# Patient Record
Sex: FEMALE | Race: WHITE | Employment: OTHER | URBAN - METROPOLITAN AREA
[De-identification: names, ages, dates, MRNs, and addresses within clinical notes are randomized per-mention and may not be internally consistent; named-entity substitution may affect disease eponyms.]

---

## 2018-03-29 ENCOUNTER — TRANSFERRED RECORDS (OUTPATIENT)
Dept: HEALTH INFORMATION MANAGEMENT | Facility: CLINIC | Age: 73
End: 2018-03-29

## 2018-05-08 LAB — MAMMOGRAM: NORMAL

## 2018-05-15 ENCOUNTER — TRANSFERRED RECORDS (OUTPATIENT)
Dept: HEALTH INFORMATION MANAGEMENT | Facility: CLINIC | Age: 73
End: 2018-05-15

## 2018-11-15 ENCOUNTER — TRANSFERRED RECORDS (OUTPATIENT)
Dept: HEALTH INFORMATION MANAGEMENT | Facility: CLINIC | Age: 73
End: 2018-11-15

## 2018-12-20 ENCOUNTER — TRANSFERRED RECORDS (OUTPATIENT)
Dept: HEALTH INFORMATION MANAGEMENT | Facility: CLINIC | Age: 73
End: 2018-12-20

## 2019-01-30 ENCOUNTER — DOCUMENTATION ONLY (OUTPATIENT)
Dept: CARE COORDINATION | Facility: CLINIC | Age: 74
End: 2019-01-30

## 2019-02-15 NOTE — TELEPHONE ENCOUNTER
FUTURE VISIT INFORMATION      FUTURE VISIT INFORMATION:    Date: 2/20/19    Time: 2:15PM    [place] [facility] [place]ERR[place] INFORMATION:    Referring provider:  Aundrea Sommers MD      Referring providers clinic:  [facility]     Reason for visit/diagnosis  Chronic sphenoidal sinusitis      RECORDS REQUESTED FROM:       Clinic name Comments Records Status Imaging Status   [facility]   11/7/18, 10/4/17 notes with Dr Sommers Care Everywhere    [facility]  - ENT 3/28/18, 4/18/18  notes with Juan Carlos Burden  Care Everywhere    [facility]  Imaging - ph. [phone]   3/29/18 CT Sinus Report: Care Everywhere PACS   [facility] 5/15/18, 5/24/18, 8/21/18, 10/16/18, 10/19/18, 10/22/18, 10/29/18, 11/15/18, notes with Dr Portillo    11/15/18 CT Sinus In [facility] PACS - rec'vd images 2/19 sent to Film room to upload       2/15/19 7:20AM sent a fax request via [employer] to [facility] - [place]  2/15/19 12:44PM sent a fax request to [facility] to push images to [facility] P[facility] - [place]  2/19/19 10:12AM called [facility], CT sinus images will be pushed to [facility] PACS sometime today - [place]  2/19/19 11:58AM received images from [facility], [place] [facility] [place] sending disc to film room to get uploaded, might not be available to view at the time of appointment - [place]

## 2019-02-20 ENCOUNTER — PRE VISIT (OUTPATIENT)
Dept: OTOLARYNGOLOGY | Facility: CLINIC | Age: 74
End: 2019-02-20

## 2019-06-12 ENCOUNTER — OFFICE VISIT (OUTPATIENT)
Dept: OTOLARYNGOLOGY | Facility: CLINIC | Age: 74
End: 2019-06-12
Payer: MEDICARE

## 2019-06-12 VITALS
SYSTOLIC BLOOD PRESSURE: 133 MMHG | BODY MASS INDEX: 22.38 KG/M2 | DIASTOLIC BLOOD PRESSURE: 79 MMHG | HEIGHT: 65 IN | HEART RATE: 91 BPM | WEIGHT: 134.3 LBS | RESPIRATION RATE: 16 BRPM

## 2019-06-12 DIAGNOSIS — J32.3 SINUSITIS CHRONIC, SPHENOIDAL: Primary | ICD-10-CM

## 2019-06-12 RX ORDER — SIMVASTATIN 10 MG
10 TABLET ORAL AT BEDTIME
COMMUNITY
Start: 2019-05-22

## 2019-06-12 RX ORDER — PREDNISONE 1 MG/1
TABLET ORAL
Refills: 2 | COMMUNITY
Start: 2019-04-03 | End: 2019-07-01

## 2019-06-12 RX ORDER — LANOLIN ALCOHOL/MO/W.PET/CERES
CREAM (GRAM) TOPICAL
COMMUNITY

## 2019-06-12 RX ORDER — LISINOPRIL 10 MG/1
10 TABLET ORAL AT BEDTIME
COMMUNITY
Start: 2018-05-04

## 2019-06-12 RX ORDER — CETIRIZINE HYDROCHLORIDE 10 MG/1
10 TABLET ORAL EVERY MORNING
COMMUNITY
Start: 2017-02-01

## 2019-06-12 ASSESSMENT — PAIN SCALES - GENERAL: PAINLEVEL: NO PAIN (0)

## 2019-06-12 ASSESSMENT — MIFFLIN-ST. JEOR: SCORE: 1120.06

## 2019-06-12 NOTE — LETTER
2019       RE: Sabine Mccormack  [address] [facility] [address]     Dear Colleague,    Thank you for referring your patient, Sabine Mccormack, to the [facility] EAR NOSE AND THROAT at [facility]. Please see a copy of my visit note below.    Otolaryngology Adult Consultation    Patient: Sabine Mccormack   : 1945     Patient presents with:  Consult:   Chief Complaint   Patient presents with     Consult     surgery consult, chronic sphenoidal sinusitis        Referring Provider: Aundrea Sommers   Consulting Physician:  Nayely Lorenzo MD       Assessment/Plan: Chronic sphenoid sinusitis in patient on immunosuppression. Recommend definitive surgical treatment. Discussed risks and benefits. She has some concerns regarding anesthesia - has not had prior surgery. Discussed risks and benefits of surgery.      HPI: Sabine Mccormack is a 74 year old female seen today in the Otolaryngology Clinic in consultation from Aundrea Sommers for a history of chronic sphenoid sinusitis.  Symptoms include headache, post nasal drainage and congeston. Duration of symptoms has been years. The sphenoid sinus issue was discovered on imaging after she saw an ENT physician for these issues as well as epistaxis.  She has seen Dr. Portillo for these issues since that time. After medical therapy her symptoms persisted, therefore, an in office turbinate reduction and sphenoidotomy was recommended and performed. The patient describes significant difficulty since that time and presents after repeat imaging shows ongoing sphenoid disease.     Current Outpatient Medications on File Prior to Visit:  cetirizine (ZYRTEC) 10 MG tablet Take 10 mg by mouth every 24 hours   lisinopril (PRINIVIL/ZESTRIL) 10 MG tablet every 24 hours   simvastatin (ZOCOR) 10 MG tablet every 24 hours   folic acid (FOLVITE) 400 MCG tablet folic acid   Methotrexate Sodium (TREXALL PO) methotrexate sodium   predniSONE (DELTASONE) 1 MG tablet  TK 3 TS PO ONCE D WITH A MEAL OR UTD BY MD.     No current facility-administered medications on file prior to visit.        Allergies   Allergen Reactions     Sulfa Drugs Hives       Past Medical History:   Diagnosis Date     Allergic rhinitis      Arthritis      Autoimmune disease (H)      Chronic sinusitis      Chronic tonsillitis      Hoarseness      Hypertension      Immunosuppression (H)      Osteoporosis      Reduced vision        Social History     Occupational History     Not on file   Tobacco Use     Smoking status: Never Smoker     Smokeless tobacco: Never Used   Substance and Sexual Activity     Alcohol use: Not on file     Drug use: Not on file     Sexual activity: Not on file        Review of Systems   ENT ROS 6/9/2019   Ears, Nose, Throat Ear pain, Nasal congestion or drainage, Trouble swallowing        14 point ROS neg other than the symptoms noted above.    Physical Exam:    General Assessment   The patient is alert, oriented and in no acute distress.   Head/Face/Scalp  Grossly normal.   Ears  Normal canals, auricles and tympanic membranes.   Nose  External nose is straight, skin is normal. Intranasal exam (anterior rhinoscopy) reveals normal moist mucosa, turbinate tissue without edema, erythema or crusting.  Septum mainly in the midline.    Mouth  Oral cavity shows healthy mucosa with out ulceration, masses or other lesions involving the tongue, palate, buccal mucosa, floor of mouth or gingiva.  Dentition in good repair.  Oropharynx with normal tonsils, posterior wall and base of tongue.  Neck  No significant adenopathy, thyroid or salivary gland abnormality.         Again, thank you for allowing me to participate in the care of your patient.      Sincerely,    Nayely Lorenzo MD

## 2019-06-12 NOTE — NURSING NOTE
Chief Complaint   Patient presents with     Consult     surgery consult, chronic sphenoidal sinusitis     Nadege Parker LPN

## 2019-06-12 NOTE — PATIENT INSTRUCTIONS
Surgery Teaching      1.You must have a physical exam (called  history and physical ) within 30 days of surgery. You can do this at the PAC clinic or your family clinic.     2.For same-day surgery, you must arrange for an adult to take you home from the Center. An adult must stay with you for the first 24 hours after surgery. You cannot     3.Ask your doctor what medicines (including aspirin and ibuprofen) are safe before surgery.     4. Stop drinking alcohol at least 24 hours before surgery.     5. Stop or at least cut down on smoking 24 hours before surgery.    6.Take a bath or shower the night before and the morning of surgery (as told by your surgeon). Use an antiseptic soap. If your doctor does not give you special soap, buy Hibiclens or Es-Stat at the drug store or ask the pharmacist to suggest a brand.  Do not put on lotion, powder, perfume, deodorant or make-up after bathing.    7. You can eat a normal meal the night before surgery. Do not eat any solid foods or drink any milk products for 8 hours before surgery.     8. You may drink clear liquids until 2 hours before surgery. Clear liquids include water, Gatorade, apple juice and liquids you can read through.

## 2019-06-16 NOTE — PROGRESS NOTES
Otolaryngology Adult Consultation    Patient: Sabine Mccormack   : 1945     Patient presents with:  Consult:   Chief Complaint   Patient presents with     Consult     surgery consult, chronic sphenoidal sinusitis        Referring Provider: Aundrea Sommers   Consulting Physician:  Nayely Lorenzo MD       Assessment/Plan: Chronic sphenoid sinusitis in patient on immunosuppression. Recommend definitive surgical treatment. Discussed risks and benefits. She has some concerns regarding anesthesia - has not had prior surgery. Discussed risks and benefits of surgery.      HPI: Sabine Mccormack is a 74 year old female seen today in the Otolaryngology Clinic in consultation from Aundrea Sommers for a history of chronic sphenoid sinusitis.  Symptoms include headache, post nasal drainage and congeston. Duration of symptoms has been years. The sphenoid sinus issue was discovered on imaging after she saw an ENT physician for these issues as well as epistaxis.  She has seen Dr. Portillo for these issues since that time. After medical therapy her symptoms persisted, therefore, an in office turbinate reduction and sphenoidotomy was recommended and performed. The patient describes significant difficulty since that time and presents after repeat imaging shows ongoing sphenoid disease.     Current Outpatient Medications on File Prior to Visit:  cetirizine (ZYRTEC) 10 MG tablet Take 10 mg by mouth every 24 hours   lisinopril (PRINIVIL/ZESTRIL) 10 MG tablet every 24 hours   simvastatin (ZOCOR) 10 MG tablet every 24 hours   folic acid (FOLVITE) 400 MCG tablet folic acid   Methotrexate Sodium (TREXALL PO) methotrexate sodium   predniSONE (DELTASONE) 1 MG tablet TK 3 TS PO ONCE D WITH A MEAL OR UTD BY MD.     No current facility-administered medications on file prior to visit.        Allergies   Allergen Reactions     Sulfa Drugs Hives       Past Medical History:   Diagnosis Date     Allergic rhinitis      Arthritis      Autoimmune disease (H)       Chronic sinusitis      Chronic tonsillitis      Hoarseness      Hypertension      Immunosuppression (H)      Osteoporosis      Reduced vision        Social History     Occupational History     Not on file   Tobacco Use     Smoking status: Never Smoker     Smokeless tobacco: Never Used   Substance and Sexual Activity     Alcohol use: Not on file     Drug use: Not on file     Sexual activity: Not on file        Review of Systems   ENT ROS 6/9/2019   Ears, Nose, Throat Ear pain, Nasal congestion or drainage, Trouble swallowing        14 point ROS neg other than the symptoms noted above.    Physical Exam:    General Assessment   The patient is alert, oriented and in no acute distress.   Head/Face/Scalp  Grossly normal.   Ears  Normal canals, auricles and tympanic membranes.   Nose  External nose is straight, skin is normal. Intranasal exam (anterior rhinoscopy) reveals normal moist mucosa, turbinate tissue without edema, erythema or crusting.  Septum mainly in the midline.    Mouth  Oral cavity shows healthy mucosa with out ulceration, masses or other lesions involving the tongue, palate, buccal mucosa, floor of mouth or gingiva.  Dentition in good repair.  Oropharynx with normal tonsils, posterior wall and base of tongue.  Neck  No significant adenopathy, thyroid or salivary gland abnormality.

## 2019-06-20 ENCOUNTER — DOCUMENTATION ONLY (OUTPATIENT)
Dept: OTOLARYNGOLOGY | Facility: CLINIC | Age: 74
End: 2019-06-20

## 2019-06-20 NOTE — PROGRESS NOTES
Patient is scheduled for surgery with Dr. Lorenzo     Proc:  Right Sphenoidotomy with Stealth    Spoke or left message with: patient    Date of Surgery: 7/8/19    Location: [facility]    H&P: Scheduled with PAC  7/1/19  9:30 am    Additional imaging/appointments: Post OP 7/17/19  1:00    Surgery packet: given at clinic appt             Chela Sanchez   [facility] [place]-Op Coordinator  [phone]

## 2019-06-21 ENCOUNTER — PRE VISIT (OUTPATIENT)
Dept: SURGERY | Facility: CLINIC | Age: 74
End: 2019-06-21

## 2019-06-21 NOTE — TELEPHONE ENCOUNTER
FUTURE VISIT INFORMATION      SURGERY INFORMATION:    Date: 7/8/19    Location: [facility]    Surgeon:  Nayely Lorenzo    Anesthesia Type:  General    RECORDS REQUESTED FROM:       Primary Care Provider: Aundrea Díaz    Pertinent Medical History: Hypertension

## 2019-06-25 ENCOUNTER — TRANSFERRED RECORDS (OUTPATIENT)
Dept: HEALTH INFORMATION MANAGEMENT | Facility: CLINIC | Age: 74
End: 2019-06-25

## 2019-07-01 ENCOUNTER — OFFICE VISIT (OUTPATIENT)
Dept: SURGERY | Facility: CLINIC | Age: 74
End: 2019-07-01
Payer: MEDICARE

## 2019-07-01 ENCOUNTER — ANESTHESIA EVENT (OUTPATIENT)
Dept: SURGERY | Facility: AMBULATORY SURGERY CENTER | Age: 74
End: 2019-07-01

## 2019-07-01 VITALS
OXYGEN SATURATION: 97 % | HEIGHT: 65 IN | DIASTOLIC BLOOD PRESSURE: 79 MMHG | BODY MASS INDEX: 22.92 KG/M2 | RESPIRATION RATE: 16 BRPM | WEIGHT: 137.6 LBS | SYSTOLIC BLOOD PRESSURE: 127 MMHG | HEART RATE: 81 BPM | TEMPERATURE: 97.5 F

## 2019-07-01 DIAGNOSIS — Z01.818 PREOP EXAMINATION: Primary | ICD-10-CM

## 2019-07-01 RX ORDER — IBUPROFEN 200 MG
200-400 TABLET ORAL PRN
COMMUNITY

## 2019-07-01 SDOH — HEALTH STABILITY: MENTAL HEALTH: HOW OFTEN DO YOU HAVE A DRINK CONTAINING ALCOHOL?: 2-3 TIMES A WEEK

## 2019-07-01 ASSESSMENT — MIFFLIN-ST. JEOR: SCORE: 1135.03

## 2019-07-01 NOTE — ANESTHESIA PREPROCEDURE EVALUATION
"Anesthesia Pre-Procedure Evaluation    Patient: Sabine Mccormack   MRN:     [id number] Gender:   female   Age:    74 year old :      1945        Preoperative Diagnosis: Sphenoidal Sinusitis   Procedure(s):  Right Sphenoidotomy with Stealth     Past Medical History:   Diagnosis Date     Allergic rhinitis      Arthritis      Autoimmune disease (H)      Chronic sinusitis      Chronic tonsillitis      Hoarseness      Hypertension      Immunosuppression (H)      Osteoporosis      Reduced vision       Past Surgical History:   Procedure Laterality Date     COLONOSCOPY       ORTHOPEDIC SURGERY      bunion surgery          Anesthesia Evaluation     . Pt has had prior anesthetic. Type: MAC           ROS/MED HX    ENT/Pulmonary: Comment: Chronic sinusitis    (+)allergic rhinitis, , . .    Neurologic: Comment: Chronic \"sinus\" headaches    (+)neuropathy - hands & feet from RA,     Cardiovascular:     (+) hypertension-range: stable on lisinopril, ---. : . . . :. . No previous cardiac testing       METS/Exercise Tolerance:  >4 METS   Hematologic:  - neg hematologic  ROS       Musculoskeletal: Comment: Rheumatoid arthritis (mostly hands/feet/wrist involvement)        GI/Hepatic:  - neg GI/hepatic ROS       Renal/Genitourinary:  - ROS Renal section negative       Endo:  - neg endo ROS       Psychiatric:  - neg psychiatric ROS       Infectious Disease:  - neg infectious disease ROS       Malignancy:      - no malignancy   Other:    (+) No chance of pregnancy C-spine cleared: N/A, no H/O Chronic Pain,no other significant disability                        PHYSICAL EXAM:   Mental Status/Neuro: A/A/O; Age Appropriate   Airway: Facies: Feasible  Mallampati: IV  Mouth/Opening: Limited  TM distance: > 6 cm  Neck ROM: Full   Respiratory: Auscultation: CTAB     Resp. Rate: Normal     Resp. Effort: Normal      CV: Rhythm: Regular  Rate: Age appropriate  Heart: Normal Sounds   Comments:      Dental: Normal                  No results " "found for: WBC, HGB, HCT, PLT, CRP, SED, NA, POTASSIUM, CHLORIDE, CO2, BUN, CR, GLC, STEVEN, PHOS, MAG, ALBUMIN, PROTTOTAL, ALT, AST, GGT, ALKPHOS, BILITOTAL, BILIDIRECT, LIPASE, AMYLASE, JAZMIN, PTT, INR, FIBR, TSH, T4, T3, HCG, HCGS, CKTOTAL, CKMB, TROPN    Preop Vitals  BP Readings from Last 3 Encounters:   07/01/19 127/79   06/12/19 133/79    Pulse Readings from Last 3 Encounters:   07/01/19 81   06/12/19 91      Resp Readings from Last 3 Encounters:   07/01/19 16   06/12/19 16    SpO2 Readings from Last 3 Encounters:   07/01/19 97%      Temp Readings from Last 1 Encounters:   07/01/19 97.5  F (36.4  C) (Oral)    Ht Readings from Last 1 Encounters:   07/01/19 1.651 m (5' 5\")      Wt Readings from Last 1 Encounters:   07/01/19 62.4 kg (137 lb 9.6 oz)    Estimated body mass index is 22.9 kg/m  as calculated from the following:    Height as of this encounter: 1.651 m (5' 5\").    Weight as of this encounter: 62.4 kg (137 lb 9.6 oz).     LDA:            Assessment:   ASA SCORE: 3    NPO Status: > 6 hours since completed Solid Foods   Documentation: H&P complete; Preop Testing complete; Consents complete   Proceeding: Proceed without further delay  Tobacco Use:  NO Active use of Tobacco/UNKNOWN Tobacco use status     Plan:   Anes. Type:  General   Pre-Induction: Midazolam IV; Acetaminophen PO   Induction:  IV (Standard)   Airway: Oral ETT   Access/Monitoring: PIV   Maintenance: Balanced   Emergence: Procedure Site   Logistics: Same Day Surgery     Postop Pain/Sedation Strategy:  Standard-Options: Opioids PRN     PONV Management:  Adult Risk Factors: Female, Non-Smoker, Postop Opioids  Prevention: Ondansetron; Dexamethasone     CONSENT: Direct conversation   Plan and risks discussed with: Patient   Blood Products: Consent Deferred (Minimal Blood Loss)       Comments for Plan/Consent:  CMAC for intubation                  PAC Discussion and Assessment    ASA Classification: 2  Case is suitable for: [facility]  Anesthetic " techniques and relevant risks discussed: GA  Invasive monitoring and risk discussed: No  Types:   Possibility and Risk of blood transfusion discussed: No  NPO instructions given:   Additional anesthetic preparation and risks discussed:   Needs early admission to pre-op area:   Other:     PAC Resident/NP Anesthesia Assessment:  Sabine Mccormack is a 74 year old female scheduled to undergo Right Sphenoidotomy with Stealth with Nayely Lorenzo MD on 7/8/19 at  [facility] for treatment of chronic sinusitis.     She has the following specific operative considerations:      - Pt has had prior anesthetic. Type: MAC - no complications per pt  - Anesthesia considerations:  Refer to PAC assessment in anesthesia records  - Risk of PONV score = 2.  If > 2, anti-emetic intervention recommended.    CARDIAC: METS >4      RCRI : No serious cardiac risks.  0.4% risk of major adverse cardiac event.      HTN - well managed on lisinopril    PULMONARY: [place] # of risks 2/8 = low risk     Never smoked, no asthma    GI: no GERD    ENDO/IMMUNE: BMI 22, no DM     Rheumatoid arthritis, stopped prednisone one month ago, taking methotrexate (will stop 1 week prior to surgery & 1 week after per rheumatologist recommendation @ [facility])    HEME: VTE risk: 0.5%       ORTHO: Mildly limited extension neck ROM     Moderate/Severe TMJ, R>L. Moderate limitation with opening mouth - may require glidescope for intubation      Hands/wrists/feet involvement with RA    NEURO:  Pts mother had significant cognitive decline following anesthesia for hip replacement surgery in 1991. She is concerned about this risk w/ herself, as she has only received MAC in the past for a colonoscopy.     Patient was discussed with Dr Burkett. Patient is optimized and is acceptable candidate for the proposed procedure, provided labs today are within acceptable range. No further diagnostic evaluation is needed.      Reviewed and Signed by PAC Mid-Level  Provider/Resident  Mid-Level Provider/Resident: Beatriz Tafoya PA-C  Date: 7/1/19  Time: 1240    Attending Anesthesiologist Anesthesia Assessment:  Pt seen because of concerns of mouth opening limitations due to TMJ disease.  She brought her jaw MRI for review.  On physical examination, she has 3cm mouth opening with the tongue occupying much of the oral volume.  Her voice is clear.  I am not certain what amount of mouth opening restriction is related to anatomic versus muscular sources.  She should have a videolaryngoscope available for anesthesia induction.  We discussed/described the process of an awake fiberoptic intubation, if that course of action is chosen.  She also has concerns regarding neurological compromise following anesthesia.  Her mother had mild dementia, underwent a LUICANO in 1991, but never recovered neurologic function thereafter.  We discussed the risk of postoperative demential/neurological decline as well as interventions we commonly make in assuring adequate depth of anesthesia and cerebral perfusion.  She will address this, again, the day of surgery.  She is otherwise healthy and in need of no additional evaluation prior to surgery.    Reviewed and Signed by PAC Anesthesiologist  Anesthesiologist: Kwadwo  Date: July 1, 2019  Time:   Pass/Fail: Pass  Disposition:     PAC Pharmacist Assessment:        Pharmacist:   Date:   Time:        Beatriz Tafoya PA-C

## 2019-07-01 NOTE — H&P
Pre-Operative H & P     CC:  Preoperative exam to assess for increased cardiopulmonary risk while undergoing surgery and anesthesia.    Date of Encounter: 7/1/2019  Primary Care Physician:  Aundrea Sommers  Reason for Visit: Sinusitis chronic, sphenoidal [J32.3]  - Primary    HPI  Sabine Mccormack is a 75 y/o female who presents for pre-operative H&P in preparation for Right Sphenoidotomy with Stealth with Nayely Lorenzo MD on 7/8/19 at  [facility] for treatment of chronic sinusitis.    Ms. Mccormack has a long history of several years of chronic sphenoid sinusitis with headache, post nasal drainage and congestion. She underwent an in-office turbinate reduction and sphenoidotomy, but has had significant difficulty since that time. Repeat imaging shows ongoing sphenoid disease. 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 She now presents for the above procedure.    PMH is also significant for rheumatoid arthritis (stable on methotrexate, mostly hand/feet/wrist involvement).     History was obtained from patient & chart review.    Past Medical History  Past Medical History:   Diagnosis Date     Allergic rhinitis      Arthritis      Autoimmune disease (H)      Chronic sinusitis      Chronic tonsillitis      Hoarseness      Hypertension      Immunosuppression (H)      Osteoporosis      Reduced vision        Past Surgical History  Past Surgical History:   Procedure Laterality Date     COLONOSCOPY       ORTHOPEDIC SURGERY      bunion surgery       Hx of Blood transfusions/reactions: no     Hx of abnormal bleeding or anti-platelet use: no    Menstrual history: No LMP recorded. Patient is postmenopausal.:      Steroid use in the last year: yes, stopped prednisone 1 month ago. Currently on methotrexate for RA.    Personal or FH with difficulty with Anesthesia:  Mother had significant cognitive decline following hip surgery in 1991    Prior to Admission Medications  Current  Outpatient Medications   Medication Sig Dispense Refill     Acetaminophen (TYLENOL PO) Take by mouth as needed for mild pain or fever       cetirizine (ZYRTEC) 10 MG tablet Take 10 mg by mouth every morning        Cholecalciferol (VITAMIN D PO) Take 1 tablet by mouth every morning       folic acid (FOLVITE) 400 MCG tablet 1 tablet in the morning       ibuprofen (ADVIL/MOTRIN) 200 MG tablet Take 200-400 mg by mouth as needed for mild pain       lisinopril (PRINIVIL/ZESTRIL) 10 MG tablet Take 10 mg by mouth At Bedtime        Methotrexate Sodium (TREXALL PO) 2.5 mg Every Friday in the morning       simvastatin (ZOCOR) 10 MG tablet Take 10 mg by mouth At Bedtime          Allergies  Allergies   Allergen Reactions     Penicillins Nausea     Other reaction(s): GI Upset     Sulfa Drugs Hives       Social History  Social History     Socioeconomic History     Marital status:      Spouse name: Not on file     Number of children: Not on file     Years of education: Not on file     Highest education level: Not on file   Occupational History     Not on file   Social Needs     Financial resource strain: Not on file     Food insecurity:     Worry: Not on file     Inability: Not on file     Transportation needs:     Medical: Not on file     Non-medical: Not on file   Tobacco Use     Smoking status: Never Smoker     Smokeless tobacco: Never Used   Substance and Sexual Activity     Alcohol use: Yes     Alcohol/week: 0.6 oz     Types: 1 Glasses of wine per week     Frequency: 2-3 times a week     Drug use: Never     Sexual activity: Not on file   Lifestyle     Physical activity:     Days per week: Not on file     Minutes per session: Not on file     Stress: Not on file   Relationships     Social connections:     Talks on phone: Not on file     Gets together: Not on file     Attends Muslim service: Not on file     Active member of club or organization: Not on file     Attends meetings of clubs or organizations: Not on file      "Relationship status: Not on file     Intimate partner violence:     Fear of current or ex partner: Not on file     Emotionally abused: Not on file     Physically abused: Not on file     Forced sexual activity: Not on file   Other Topics Concern     Not on file   Social History Narrative     Not on file       Family History  Family History   Problem Relation Age of Onset     Anesthesia Reaction Mother         had early dementia, then signif decreased mental status after anes.     Myocardial Infarction Mother 65        had \"weak heart\" from [place] flu in childhood     Deep Vein Thrombosis (DVT) No family hx of        ROS/MED HX  The complete review of systems is negative other than noted in the HPI or here.  Patient denies recent illness, fever and respiratory infection during past month.  Pt denies steroid use during past year.    ENT/Pulmonary: Comment: Chronic sinusitis    (+)allergic rhinitis, , . .    Neurologic: Comment: Chronic \"sinus\" headaches    (+)neuropathy - hands & feet from RA,     Cardiovascular:     (+) hypertension-range: stable on lisinopril, ---. : . . . :. . No previous cardiac testing       METS/Exercise Tolerance:  >4 METS   Hematologic:  - neg hematologic  ROS       Musculoskeletal: Comment: Rheumatoid arthritis (mostly hands/feet/wrist involvement)        GI/Hepatic:  - neg GI/hepatic ROS       Renal/Genitourinary:  - ROS Renal section negative       Endo:  - neg endo ROS       Psychiatric:  - neg psychiatric ROS       Infectious Disease:  - neg infectious disease ROS       Malignancy:      - no malignancy   Other:    (+) No chance of pregnancy C-spine cleared: N/A, no H/O Chronic Pain,no other significant disability                PHYSICAL EXAM:   Mental Status/Neuro: A/A/O; Age Appropriate   Airway: Facies: Feasible  Mallampati: IV  Mouth/Opening: Limited  TM distance: > 6 cm  Neck ROM: Full   Respiratory: Auscultation: CTAB     Resp. Rate: Normal     Resp. Effort: Normal      CV: Rhythm: " "Regular  Rate: Age appropriate  Heart: Normal Sounds   Comments:      Dental: Normal                Preop Vitals  BP Readings from Last 3 Encounters:   07/01/19 127/79   06/12/19 133/79    Pulse Readings from Last 3 Encounters:   07/01/19 81   06/12/19 91      Resp Readings from Last 3 Encounters:   07/01/19 16   06/12/19 16    SpO2 Readings from Last 3 Encounters:   07/01/19 97%      Temp Readings from Last 1 Encounters:   07/01/19 97.5  F (36.4  C) (Oral)    Ht Readings from Last 1 Encounters:   07/01/19 1.651 m (5' 5\")      Wt Readings from Last 1 Encounters:   07/01/19 62.4 kg (137 lb 9.6 oz)    Estimated body mass index is 22.9 kg/m  as calculated from the following:    Height as of this encounter: 1.651 m (5' 5\").    Weight as of this encounter: 62.4 kg (137 lb 9.6 oz).     Temp: 97.5  F (36.4  C) Temp src: Oral BP: 127/79 Pulse: 81   Resp: 16 SpO2: 97 %         137 lbs 9.6 oz  5' 5\"   Body mass index is 22.9 kg/m .    Physical Exam  Constitutional: Awake, alert, cooperative, no apparent distress, and appears stated age.  Eyes: Pupils equal, round and reactive to light, extra ocular muscles intact, sclera clear, conjunctiva normal.  HENT: Normocephalic, oral pharynx with moist mucus membranes, good dentition. No goiter appreciated. No removable dental hardware. Limited mouth opening ~ 3 cm.  Respiratory: Clear to auscultation bilaterally, no crackles or wheezing.   Cardiovascular: Regular rate and rhythm, normal S1 and S2, and no murmur noted.  Carotids +2, no bruits. No edema. Palpable pulses to radial, DP and PT arteries.   GI: Normal bowel sounds, soft, non-distended, non-tender, no masses palpated.    Lymph/Hematologic: No cervical lymphadenopathy and no supraclavicular lymphadenopathy.  Genitourinary:  deferred  Skin: Warm and dry.  No rashes.   Musculoskeletal: Mildly limited extention ROM of neck. There is no redness, warmth, or swelling of the joints. Gross motor strength is normal.    Neurologic: " Awake, alert, oriented to name, place and time. Cranial nerves II-XII are grossly intact. Gait is normal.   Neuropsychiatric: Calm, cooperative. Normal affect. Pleasant. Answers questions appropriately, follows commands w/o difficulty.    PRIOR LABS/DIAGNOSTIC STUDIES:  All labs and imaging personally reviewed    Labs 6/25/19: WBC 6.3, Hgb 13.3, hematocrit 40.0, Plt 300    Creatinine 0.81, GFR >60, ALT 18, AST 21     ANCA negative MPO & PR3    MRI SKULL BASE & TEMPOROMANDIBULAR JOINTS 12/20/18:  1. Left TMJ: Early intermediate stage II internal derangement.  2. Right TMJ: Intermediate stage III internal derangement with active inflammatory arthropathy & mildly restricted translation.  3. Opacification of the sphenoid sinus centrally and on the right side, indicative of sphenoid ostium obstruction.  4. 14 mm fluid-containing, irregular cystic structure subcutaneously on the right, anterior to the right masseter. Diagnostic considerations would include an old, abscess cavity and possibly a cyst relating to the right parotid duct, which passes in this immediate vicinity.    Labs today: not indicated    Outside records reviewed from: Care Everywhere    ASSESSMENT and PLAN  Sabine Mccormack is a 74 year old female scheduled to undergo Right Sphenoidotomy with Stealth with Nayeyl Lorenzo MD on 7/8/19 at  [facility] for treatment of chronic sinusitis.     She has the following specific operative considerations:      - Pt has had prior anesthetic. Type: MAC - no complications per pt  - Anesthesia considerations:  Refer to PAC assessment in anesthesia records  - Risk of PONV score = 2.  If > 2, anti-emetic intervention recommended.    CARDIAC: METS >4      RCRI : No serious cardiac risks.  0.4% risk of major adverse cardiac event.      HTN - well managed on lisinopril    PULMONARY: [place] # of risks 2/8 = low risk     Never smoked, no asthma    GI: no GERD    ENDO/IMMUNE: BMI 22, no DM     Rheumatoid  arthritis, stopped prednisone one month ago, taking methotrexate (will stop 1 week prior to surgery & 1 week after per rheumatologist recommendation @ [facility])    HEME: VTE risk: 0.5%       ORTHO: Mildly limited extension neck ROM     Moderate/Severe TMJ, R>L. Moderate limitation with opening mouth - may require glidescope for intubation      Hands/wrists/feet involvement with RA    NEURO:  Pts mother had significant cognitive decline following anesthesia for hip replacement surgery in 1991. She is concerned about this risk w/ herself, as she has only received MAC in the past for a colonoscopy.     Patient was discussed with Dr Burkett. Patient is optimized and is acceptable candidate for the proposed procedure, provided labs today are within acceptable range. No further diagnostic evaluation is needed.      Arrival time, NPO, shower and medication instructions provided by nursing staff today.  Preparing For Your Surgery handout given.      Beatriz Tafoya PA-C  Preoperative Assessment Center  [facility] and Surgery Center  Phone: [phone]  Fax: [phone]

## 2019-07-01 NOTE — PATIENT INSTRUCTIONS
Preparing for Your Surgery      Name:  Sabine Mccormack   MRN:  [id number]   :  1945   Today's Date:  2019     Arriving for surgery:  Surgery date:  19  Arrival time:  08:00 am  Please come to:     [facility]  [address] [place] [id number]     Parking is available in front of the Clinics and Surgery Center building from 5:30AM to 8:00PM.  -  Proceed to the 5th floor to check into the Ambulatory Surgery Center.              >> There will be patient concierges on the 1st and 5th floor, for assistance or an escort, if you would like.              >> Please call [phone] with any questions.    What can I eat or drink?  -  You may have solid food or milk products until 8 hours prior to your surgery.  -  You may have water, apple juice or 7up/Sprite until 2 hours prior to your surgery.    Which medicines can I take?  Stop Aspirin, vitamins and supplements one week prior to surgery.  Hold Ibuprofen for 24 hours and/or Naproxen for 48 hours prior to surgery.   -  Do NOT take these medications in the morning, the day of surgery:  Lisinopril if normally taken in the morning.  -  Please take these medications the day of surgery:  Tylenol if needed; take all other scheduled medications if normally taken in the morning.    How do I prepare myself?  -  Take two showers: one the night before surgery; and one the morning of surgery.         Use Scrubcare or Hibiclens to wash from neck down.  You may use your own     shampoo and conditioner. No other hair products.   -  Do NOT use lotion, powder, deodorant, or antiperspirant the day of your surgery.  -  Do NOT wear any makeup, fingernail polish or jewelry.  - Do not bring your own medications to the hospital, except for inhalers and eye   drops.  -  Bring your ID and insurance card.    -If you are scheduled to go home the Same Day as surgery you must have a responsible adult as a  and to stay with you overnight  the first 24 hours after surgery.     Questions or Concerns:    -If you are scheduled at the Ambulatory Surgery Center please call [phone].    -For questions after surgery please call your surgeons office.

## 2019-07-08 ENCOUNTER — DOCUMENTATION ONLY (OUTPATIENT)
Dept: CARE COORDINATION | Facility: CLINIC | Age: 74
End: 2019-07-08

## 2019-07-08 ENCOUNTER — ANESTHESIA (OUTPATIENT)
Dept: SURGERY | Facility: AMBULATORY SURGERY CENTER | Age: 74
End: 2019-07-08

## 2019-07-08 ENCOUNTER — HOSPITAL ENCOUNTER (OUTPATIENT)
Facility: AMBULATORY SURGERY CENTER | Age: 74
End: 2019-07-08
Attending: OTOLARYNGOLOGY
Payer: MEDICARE

## 2019-07-08 VITALS
WEIGHT: 137.9 LBS | OXYGEN SATURATION: 96 % | BODY MASS INDEX: 22.98 KG/M2 | HEIGHT: 65 IN | SYSTOLIC BLOOD PRESSURE: 135 MMHG | RESPIRATION RATE: 16 BRPM | HEART RATE: 84 BPM | TEMPERATURE: 97.8 F | DIASTOLIC BLOOD PRESSURE: 81 MMHG

## 2019-07-08 DIAGNOSIS — J32.3 CHRONIC SPHENOIDAL SINUSITIS: Primary | ICD-10-CM

## 2019-07-08 LAB
GRAM STN SPEC: NORMAL
GRAM STN SPEC: NORMAL
SPECIMEN SOURCE: NORMAL

## 2019-07-08 PROCEDURE — 87176 TISSUE HOMOGENIZATION CULTR: CPT | Performed by: OTOLARYNGOLOGY

## 2019-07-08 PROCEDURE — 87205 SMEAR GRAM STAIN: CPT | Performed by: OTOLARYNGOLOGY

## 2019-07-08 PROCEDURE — 87070 CULTURE OTHR SPECIMN AEROBIC: CPT | Performed by: OTOLARYNGOLOGY

## 2019-07-08 PROCEDURE — 88304 TISSUE EXAM BY PATHOLOGIST: CPT | Performed by: OTOLARYNGOLOGY

## 2019-07-08 PROCEDURE — 87102 FUNGUS ISOLATION CULTURE: CPT | Performed by: OTOLARYNGOLOGY

## 2019-07-08 RX ORDER — MEPERIDINE HYDROCHLORIDE 25 MG/ML
12.5 INJECTION INTRAMUSCULAR; INTRAVENOUS; SUBCUTANEOUS
Status: DISCONTINUED | OUTPATIENT
Start: 2019-07-08 | End: 2019-07-09 | Stop reason: HOSPADM

## 2019-07-08 RX ORDER — OXYCODONE HYDROCHLORIDE 5 MG/1
5-10 TABLET ORAL EVERY 4 HOURS PRN
Qty: 30 TABLET | Refills: 0 | Status: SHIPPED | OUTPATIENT
Start: 2019-07-08 | End: 2019-07-08

## 2019-07-08 RX ORDER — ONDANSETRON 2 MG/ML
INJECTION INTRAMUSCULAR; INTRAVENOUS PRN
Status: DISCONTINUED | OUTPATIENT
Start: 2019-07-08 | End: 2019-07-08

## 2019-07-08 RX ORDER — ONDANSETRON 2 MG/ML
4 INJECTION INTRAMUSCULAR; INTRAVENOUS EVERY 30 MIN PRN
Status: DISCONTINUED | OUTPATIENT
Start: 2019-07-08 | End: 2019-07-09 | Stop reason: HOSPADM

## 2019-07-08 RX ORDER — FENTANYL CITRATE 50 UG/ML
25-50 INJECTION, SOLUTION INTRAMUSCULAR; INTRAVENOUS
Status: DISCONTINUED | OUTPATIENT
Start: 2019-07-08 | End: 2019-07-08 | Stop reason: HOSPADM

## 2019-07-08 RX ORDER — OXYMETAZOLINE HYDROCHLORIDE 0.05 G/100ML
SPRAY NASAL
Qty: 1 BOTTLE | Refills: 0 | Status: SHIPPED | OUTPATIENT
Start: 2019-07-08

## 2019-07-08 RX ORDER — OXYCODONE HYDROCHLORIDE 5 MG/1
5 TABLET ORAL EVERY 4 HOURS PRN
Status: DISCONTINUED | OUTPATIENT
Start: 2019-07-08 | End: 2019-07-09 | Stop reason: HOSPADM

## 2019-07-08 RX ORDER — LIDOCAINE 40 MG/G
CREAM TOPICAL
Status: DISCONTINUED | OUTPATIENT
Start: 2019-07-08 | End: 2019-07-08 | Stop reason: HOSPADM

## 2019-07-08 RX ORDER — ACETAMINOPHEN 325 MG/1
975 TABLET ORAL ONCE
Status: COMPLETED | OUTPATIENT
Start: 2019-07-08 | End: 2019-07-08

## 2019-07-08 RX ORDER — ONDANSETRON 4 MG/1
4 TABLET, ORALLY DISINTEGRATING ORAL EVERY 30 MIN PRN
Status: DISCONTINUED | OUTPATIENT
Start: 2019-07-08 | End: 2019-07-09 | Stop reason: HOSPADM

## 2019-07-08 RX ORDER — OXYMETAZOLINE HYDROCHLORIDE 0.05 G/100ML
SPRAY NASAL PRN
Status: DISCONTINUED | OUTPATIENT
Start: 2019-07-08 | End: 2019-07-08 | Stop reason: HOSPADM

## 2019-07-08 RX ORDER — LIDOCAINE HYDROCHLORIDE 20 MG/ML
INJECTION, SOLUTION INFILTRATION; PERINEURAL PRN
Status: DISCONTINUED | OUTPATIENT
Start: 2019-07-08 | End: 2019-07-08

## 2019-07-08 RX ORDER — NALOXONE HYDROCHLORIDE 0.4 MG/ML
.1-.4 INJECTION, SOLUTION INTRAMUSCULAR; INTRAVENOUS; SUBCUTANEOUS
Status: DISCONTINUED | OUTPATIENT
Start: 2019-07-08 | End: 2019-07-09 | Stop reason: HOSPADM

## 2019-07-08 RX ORDER — SODIUM CHLORIDE, SODIUM LACTATE, POTASSIUM CHLORIDE, CALCIUM CHLORIDE 600; 310; 30; 20 MG/100ML; MG/100ML; MG/100ML; MG/100ML
INJECTION, SOLUTION INTRAVENOUS CONTINUOUS
Status: DISCONTINUED | OUTPATIENT
Start: 2019-07-08 | End: 2019-07-09 | Stop reason: HOSPADM

## 2019-07-08 RX ORDER — HYDROCODONE BITARTRATE AND ACETAMINOPHEN 5; 325 MG/1; MG/1
1 TABLET ORAL EVERY 6 HOURS PRN
Qty: 12 TABLET | Refills: 0 | Status: SHIPPED | OUTPATIENT
Start: 2019-07-08 | End: 2019-11-04

## 2019-07-08 RX ORDER — PROPOFOL 10 MG/ML
INJECTION, EMULSION INTRAVENOUS PRN
Status: DISCONTINUED | OUTPATIENT
Start: 2019-07-08 | End: 2019-07-08

## 2019-07-08 RX ORDER — HYDROMORPHONE HYDROCHLORIDE 1 MG/ML
.3-.5 INJECTION, SOLUTION INTRAMUSCULAR; INTRAVENOUS; SUBCUTANEOUS EVERY 10 MIN PRN
Status: DISCONTINUED | OUTPATIENT
Start: 2019-07-08 | End: 2019-07-09 | Stop reason: HOSPADM

## 2019-07-08 RX ORDER — DEXAMETHASONE SODIUM PHOSPHATE 4 MG/ML
INJECTION, SOLUTION INTRA-ARTICULAR; INTRALESIONAL; INTRAMUSCULAR; INTRAVENOUS; SOFT TISSUE PRN
Status: DISCONTINUED | OUTPATIENT
Start: 2019-07-08 | End: 2019-07-08

## 2019-07-08 RX ORDER — SODIUM CHLORIDE, SODIUM LACTATE, POTASSIUM CHLORIDE, CALCIUM CHLORIDE 600; 310; 30; 20 MG/100ML; MG/100ML; MG/100ML; MG/100ML
INJECTION, SOLUTION INTRAVENOUS CONTINUOUS
Status: DISCONTINUED | OUTPATIENT
Start: 2019-07-08 | End: 2019-07-08 | Stop reason: HOSPADM

## 2019-07-08 RX ORDER — FENTANYL CITRATE 50 UG/ML
INJECTION, SOLUTION INTRAMUSCULAR; INTRAVENOUS PRN
Status: DISCONTINUED | OUTPATIENT
Start: 2019-07-08 | End: 2019-07-08

## 2019-07-08 RX ORDER — LIDOCAINE HYDROCHLORIDE AND EPINEPHRINE 10; 10 MG/ML; UG/ML
INJECTION, SOLUTION INFILTRATION; PERINEURAL PRN
Status: DISCONTINUED | OUTPATIENT
Start: 2019-07-08 | End: 2019-07-08 | Stop reason: HOSPADM

## 2019-07-08 RX ORDER — ACETAMINOPHEN 325 MG/1
650 TABLET ORAL EVERY 4 HOURS PRN
Qty: 100 TABLET | Refills: 0 | Status: SHIPPED | OUTPATIENT
Start: 2019-07-08 | End: 2019-07-08

## 2019-07-08 RX ADMIN — OXYCODONE HYDROCHLORIDE 5 MG: 5 TABLET ORAL at 11:24

## 2019-07-08 RX ADMIN — SODIUM CHLORIDE, SODIUM LACTATE, POTASSIUM CHLORIDE, CALCIUM CHLORIDE: 600; 310; 30; 20 INJECTION, SOLUTION INTRAVENOUS at 08:30

## 2019-07-08 RX ADMIN — LIDOCAINE HYDROCHLORIDE 80 MG: 20 INJECTION, SOLUTION INFILTRATION; PERINEURAL at 09:42

## 2019-07-08 RX ADMIN — Medication 20 MG: at 09:54

## 2019-07-08 RX ADMIN — ONDANSETRON 4 MG: 2 INJECTION INTRAMUSCULAR; INTRAVENOUS at 10:39

## 2019-07-08 RX ADMIN — PROPOFOL 50 MG: 10 INJECTION, EMULSION INTRAVENOUS at 10:38

## 2019-07-08 RX ADMIN — Medication 10 MG: at 10:20

## 2019-07-08 RX ADMIN — DEXAMETHASONE SODIUM PHOSPHATE 4 MG: 4 INJECTION, SOLUTION INTRA-ARTICULAR; INTRALESIONAL; INTRAMUSCULAR; INTRAVENOUS; SOFT TISSUE at 09:42

## 2019-07-08 RX ADMIN — ACETAMINOPHEN 975 MG: 325 TABLET ORAL at 08:30

## 2019-07-08 RX ADMIN — FENTANYL CITRATE 50 MCG: 50 INJECTION, SOLUTION INTRAMUSCULAR; INTRAVENOUS at 11:26

## 2019-07-08 RX ADMIN — PROPOFOL 40 MG: 10 INJECTION, EMULSION INTRAVENOUS at 10:30

## 2019-07-08 RX ADMIN — FENTANYL CITRATE 50 MCG: 50 INJECTION, SOLUTION INTRAMUSCULAR; INTRAVENOUS at 09:36

## 2019-07-08 RX ADMIN — Medication 100 MCG: at 09:58

## 2019-07-08 RX ADMIN — PROPOFOL 160 MG: 10 INJECTION, EMULSION INTRAVENOUS at 09:42

## 2019-07-08 ASSESSMENT — MIFFLIN-ST. JEOR: SCORE: 1136.39

## 2019-07-08 NOTE — BRIEF OP NOTE
[facility]    Brief Operative Note    Pre-operative diagnosis: Sphenoidal Sinusitis  Post-operative diagnosis * No post-op diagnosis entered *  Procedure: Procedure(s):  Right Sphenoidotomy with Stealth Image Guidance  Surgeon: Surgeon(s) and Role:     * Nayely Lorenzo MD - Primary     * Mann Salinas MD - Resident - Assisting  Anesthesia: General   Estimated blood loss: Less than 50 ml  Drains: None  Specimens:   ID Type Source Tests Collected by Time Destination   1 : Right sphenoid sinus Tissue Sinus Contents, Right FUNGUS CULTURE, GRAM STAIN, TISSUE CULTURE AEROBIC BACTERIAL Nayely Lorenzo MD 7/8/2019 10:05 AM    A : Right sphenoid-rule out vasculitis and granulomas Tissue Sinus Contents, Right SURGICAL PATHOLOGY EXAM Nayely Lorenzo MD 7/8/2019 10:38 AM      Findings:   Right sphenoid fungal ball removed with irrigation and suction. Small branch of SPA at inferior aspect of sphenoid oss controlled with suction cautery. .  Complications: None.  Implants:  * No implants in log *

## 2019-07-08 NOTE — ANESTHESIA POSTPROCEDURE EVALUATION
Anesthesia POST Procedure Evaluation    Patient: Sabine Mccormack   MRN:     [id number] Gender:   female   Age:    74 year old :      1945        Preoperative Diagnosis: Sphenoidal Sinusitis   Procedure(s):  Right Sphenoidotomy with Stealth Image Guidance   Postop Comments: No value filed.       Anesthesia Type:  General  No value filed.    Reportable Event: NO     PAIN: Uncomplicated   Sign Out status: Comfortable, Well controlled pain     PONV: No PONV   Sign Out status:  No Nausea or Vomiting     Neuro/Psych: Uneventful perioperative course   Sign Out Status: Preoperative baseline; Age appropriate mentation     Airway/Resp.: Uneventful perioperative course   Sign Out Status: Non labored breathing, age appropriate RR; Resp. Status within EXPECTED Parameters     CV: Uneventful perioperative course   Sign Out status: Appropriate BP and perfusion indices; Appropriate HR/Rhythm     Disposition:   Sign Out in:  PACU  Disposition:  Phase II; Home  Recovery Course: Uneventful  Follow-Up: Not required           Last Anesthesia Record Vitals:  CRNA VITALS  2019 1034 - 2019 1134      2019             Pulse:  86    SpO2:  100 %    Resp Rate (observed):  6  (Abnormal)     Resp Rate (set):  10    EKG:  Sinus rhythm          Last PACU Vitals:  Vitals Value Taken Time   /85 2019 11:45 AM   Temp 36.4  C (97.6  F) 2019 11:45 AM   Pulse 66 2019 11:44 AM   Resp 12 2019 11:45 AM   SpO2 97 % 2019 11:45 AM   Temp src     NIBP     Pulse     SpO2     Resp     Temp     Ht Rate     Temp 2     Vitals shown include unvalidated device data.      Electronically Signed By: Ezra Mcclendon MD, 2019, 2:29 PM

## 2019-07-08 NOTE — OP NOTE
Procedure Date: 07/08/2019      STAFF SURGEON:  Nayely Lorenzo MD        RESIDENT SURGEON:  Mann Salinas MD       PROCEDURE PERFORMED:  Right endoscopic sphenoidotomy with image guidance.      PREOPERATIVE DIAGNOSIS:  Right chronic sphenoid sinusitis.      POSTOPERATIVE DIAGNOSIS:  Right chronic sphenoid sinusitis.      INDICATIONS FOR THE PROCEDURE:  Ms. Mccormack is a 74-year-old female with a long-standing history of headaches, postnasal drip and nasal congestion who was found to have chronic right sphenoid sinusitis.  This was discovered by an outside ENT physician when she was being evaluated for recurrent epistaxis.  She had an in-office turbinate reduction and sphenoidotomy, but continues to have symptoms.  The above procedures were therefore indicated.      FINDINGS:  Right sphenoid fungal ball.  A small branch of sphenopalatine artery inferior to the surgical sphenoid os cauterized with suction cautery.      ANESTHESIA:  General.      ESTIMATED BLOOD LOSS:  Less than 50 mL.      IMPLANTS:  Absorbable Gelfoam.      SPECIMENS:    1) Right sphenoid sinus contents for culture  2) Right sphenoid sinus contents for surgical pathology to rule out vasculitis and granulomas.      COMPLICATIONS:  None.      DESCRIPTION OF THE PROCEDURE:  After obtaining informed consent, the patient was brought back to the operating room and positioned supine on the operating table.  General anesthesia was induced, and the patient was orotracheally intubated.  The bed was turned 90 degrees, and the patient was prepped and draped in the standard clean fashion.  A time out was performed, and all parties were in agreement.  The Stealth image guidance system was set up and deemed to be working appropriately. Stealth was utilized to confirm landmarks for the sphenoid opening.  The entire procedure was performed under endoscopic visualization.  At this point, the right septum, inferior turbinate and middle turbinate were injected with  approximately 3.5 mL of 1% lidocaine with 1:100,000 epinephrine.  The middle turbinate was lateralized, and the scope was advanced to the level of the superior turbinate.  The superior turbinate was cauterized with suction cautery, and part of the turbinate was removed with a straight through-cut.  At this point, the sphenoid os was readily visible as well as some thick, inspissated material within.  The sphenoid os was widened using a combination of rongeurs, Blakesley forceps, straight and curved suction and the shaver.  When the sphenoid os was widened sufficiently, the sphenoid cavity was irrigated with saline, and what appeared to be a fungal ball was debrided.  This was sent for culture and pathology.  A lateral compartment of the sphenoid required additional maneuvers to enter, which included the use of a curved suction, backbiting forceps and a curved curet. A small arterial bleeder was noted just inferior to the surgical sphenoid os.  This was controlled with suction cautery.  The lateral compartment of the sphenoid that had been previously opened was irrigated, and the contents were removed with the suction.  A piece of Gelfoam was placed within the sphenoid os and between the middle turbinate and the septum.  The middle turbinate was medialized, and hemostasis was achieved.  This concluded the procedure.  The patient's care was turned back over to Anesthesia.  Her anesthesia was lightened, and she was extubated without complication.  She was transferred to the PACU in stable condition.  Dr. Christian Lorenzo was present for all critical portions of the procedure.      Dictated by Mann Salinas MD   Resident      I was present with the resident during the entire procedure and participated in the critical aspects.    CHRISTIAN LORENZO MD       As dictated by MANN SALINAS MD            D: 07/08/2019   T: 07/08/2019   MT: [email or web address]      Name:     JOANNE GARCIA   MRN:      [id number]        Account:         [id number]   :      1945           Procedure Date: 2019      Document: [id number]

## 2019-07-08 NOTE — DISCHARGE INSTRUCTIONS
[facility]  Home Care Following Anesthesia  For 24 hours after surgery:  1. Get plenty of rest.  A responsible adult must stay with you for at least 24 hours after you leave the surgery center.  2. Do not drive or use heavy equipment.  If you have weakness or tingling, don't drive or use heavy equipment until this feeling goes away.   3. Do not drink alcohol.   4. Avoid strenuous or risky activities.  Ask for help when climbing stairs.  5. You may feel lightheaded.  IF so, sit for a few minutes before standing.  Have someone help you get up.   6. If you have nausea (feel sick to your stomach): Drink only clear liquids such as apple juice, ginger ale, broth or 7-Up.  Rest may also help.  Be sure to drink enough fluids.  Move to a regular diet as you feel able.   7. You may have a slight fever.  Call the doctor if your fever is over 100 F (37.7 C) (taken under the tongue) or lasts longer than 24 hours.  8. You may have a dry mouth, a sore throat, muscle aches or trouble sleeping. These should go away after 24 hours.  9. Do not make important or legal decisions.   If you use hormonal birth control (such as the pill, patch, ring or implants):  You will need a second form of birth control for 7 days (condoms, a diaphragm or contraceptive foam).  While in the surgery center, you received a medicine called Sugammadex.  Hormonal birth control (such as the pill, patch, ring or implants) will not work as well for a week after taking this medicine.         Tips for taking pain medications  To get the best pain relief possible, remember these points:    Take pain medications as directed, before pain becomes severe.    Pain medication can upset your stomach: taking it with food may help.    Constipation is a common side effect of pain medication. Drink plenty of  fluids.    Eat foods high in fiber. Take a stool softener if recommended by your doctor or pharmacist.    Do not drink alcohol,  drive or operate machinery while taking pain medications.    Ask about other ways to control pain, such as with heat, ice or relaxation.    Tylenol/Acetaminophen Consumption: 975 mg tylenol given at 08:30AM, OK to take additional tylenol after 12:30PM. Follow package instructions.  To help encourage the safe use of acetaminophen, the makers of TYLENOL  have lowered the maximum daily dose for single-ingredient Extra Strength TYLENOL  (acetaminophen) products sold in the [place] from 8 pills per day (4,000 mg) to 6 pills per day (3,000 mg). The dosing interval has also changed from 2 pills every 4-6 hours to 2 pills every 6 hours.    If you feel your pain relief is insufficient, you may take Tylenol/Acetaminophen in addition to your narcotic pain medication.     Be careful not to exceed 3,000 mg of Tylenol/Acetaminophen in a 24 hour period from all sources.    If you are taking extra strength Tylenol/acetaminophen (500 mg), the maximum dose is 6 tablets in 24 hours.    If you are taking regular strength acetaminophen (325 mg), the maximum dose is 9 tablets in 24 hours.    Call a doctor for any of the followin. Signs of infection (fever, growing tenderness at the surgery site, a large amount of drainage or bleeding, severe pain, foul-smelling drainage, redness, swelling).  2. It has been over 8 to 10 hours since surgery and you are still not able to urinate (pass water).  3. Headache for over 24 hours.  Your doctor is:  Dr. Nayely Lorenzo, ENT Otolaryngology: [phone]  Or dial [phone] and ask for the resident on call for:  ENT Otolaryngology  For emergency care, call the:  [place] [facility]:  [phone] (TTY for hearing impaired: [phone])

## 2019-07-08 NOTE — ANESTHESIA CARE TRANSFER NOTE
Patient: Sabine Mccormack    Procedure(s):  Right Sphenoidotomy with Stealth Image Guidance    Diagnosis: Sphenoidal Sinusitis  Diagnosis Additional Information: No value filed.    Anesthesia Type:   No value filed.     Note:  Airway :Face Mask  Patient transferred to:PACU  Comments: VSS/WNL. Responds slowly to commands.Handoff Report: Identifed the Patient, Identified the Reponsible Provider, Reviewed the pertinent medical history, Discussed the surgical course, Reviewed Intra-OP anesthesia mangement and issues during anesthesia, Set expectations for post-procedure period and Allowed opportunity for questions and acknowledgement of understanding      Vitals: (Last set prior to Anesthesia Care Transfer)    CRNA VITALS  7/8/2019 1034 - 7/8/2019 1109      7/8/2019             Pulse:  86    SpO2:  100 %    Resp Rate (observed):  6  (Abnormal)     Resp Rate (set):  10                Electronically Signed By: MUKESH Iyer CRNA  July 8, 2019  11:09 AM

## 2019-07-09 ENCOUNTER — TELEPHONE (OUTPATIENT)
Dept: OTOLARYNGOLOGY | Facility: CLINIC | Age: 74
End: 2019-07-09

## 2019-07-09 NOTE — TELEPHONE ENCOUNTER
[facility]    Phone Message    May a detailed message be left on voicemail: yes    Reason for Call: Other: Patient stopped pain meds and wants to know when she can start taking Ibuprofen?  She is also not sleeping and thinks its due to the decongestant.  Can she stop taking the nose spray?      Action Taken: Message routed to:  [facility] ([facility]): ENT

## 2019-07-10 NOTE — TELEPHONE ENCOUNTER
Spoke to patient and advised that per  it was ok to take ibuprofen for pain and stop nasal spray. Pt verbalized understanding. During conversation pt stated her cheeks were a little red but she was not running a fever at all, thought might be a reaction to something. Advised pt to continue to monitor and give us a call if symptoms worsened.

## 2019-07-11 LAB — COPATH REPORT: NORMAL

## 2019-07-13 LAB
BACTERIA SPEC CULT: NO GROWTH
SPECIMEN SOURCE: NORMAL

## 2019-07-17 ENCOUNTER — OFFICE VISIT (OUTPATIENT)
Dept: OTOLARYNGOLOGY | Facility: CLINIC | Age: 74
End: 2019-07-17
Payer: MEDICARE

## 2019-07-17 VITALS
SYSTOLIC BLOOD PRESSURE: 119 MMHG | WEIGHT: 133.9 LBS | BODY MASS INDEX: 22.31 KG/M2 | RESPIRATION RATE: 16 BRPM | HEIGHT: 65 IN | HEART RATE: 97 BPM | DIASTOLIC BLOOD PRESSURE: 76 MMHG

## 2019-07-17 DIAGNOSIS — J32.3 SINUSITIS CHRONIC, SPHENOIDAL: Primary | ICD-10-CM

## 2019-07-17 ASSESSMENT — PAIN SCALES - GENERAL: PAINLEVEL: NO PAIN (0)

## 2019-07-17 ASSESSMENT — MIFFLIN-ST. JEOR: SCORE: 1118.25

## 2019-07-17 NOTE — LETTER
"2019       RE: Sabine Mccormack  [address] [address]     Dear Colleague,    Thank you for referring your patient, Sabine Mccormack, to the [facility] EAR NOSE AND THROAT at [facility]. Please see a copy of my visit note below.      Otolaryngology Clinic      Name: Sabine Mccormack  MRN: [id number]  Age: 74 year old  : 2019      Chief Complaint:   Postoperative follow up      History of Present Illness:   Sabine Mccormack is a 74 year old female who presents for postoperative follow up.  The patient is 11 days status post right sphenoidotomy.  She feels things went very well and she was able to return to regular activities by 4 or 5 days after surgery.  Essentially she feels back to normal.  Tissue was sent to pathology.  This was consistent with a fungus ball.      2019 Optical tracking system endoscopic sinus surgery (Right) Procedure: Right Sphenoidotomy with Stealth Image Guidance; Surgeon: Nayely Lorenzo MD; Location:  OR          Review of Systems:   Pertinent items are noted in HPI or as in patient entered ROS below, remainder of complete ROS is negative.       Physical Exam:   /76   Pulse 97   Resp 16   Ht 1.651 m (5' 5\")   Wt 60.7 kg (133 lb 14.4 oz)   BMI 22.28 kg/m        General Assessment   The patient is alert, oriented and in no acute distress.   Head/Face/Scalp  Grossly normal.   Nose  External nose is straight, skin is normal.     Procedure:  Endoscopy indicated for postoperative assessment  Topical anesthetic/decongestant spray applied.  Rigid scope used for visualization.  Findings:  Left side was clear.  Right side with dissolving gelfoam between the middle turbinate and septum and plug of crusting at the sphenoid osteum.  The sphenous mucosa was inflamed but healing appropriately.     Assessment and Plan:  Status post right sphenoidotomy, pathology consistent with fungal ball. Healing well without " complication.  Sinus irrigation 2 -3 times per day for the next 7 - 10 days.        Follow-up: in about 2 - 3 months.         Scribe Disclosure:  I, Fiona Sharma, am serving as a scribe to document services personally performed by Nayely Lorenzo MD at this visit, based upon the provider's statements to me. All documentation has been reviewed by the aforementioned provider prior to being entered into the official medical record.     The documentation recorded by the scribe accurately reflects the services I personally performed and the decisions made by me.   Nayely Lorenzo MD

## 2019-07-17 NOTE — NURSING NOTE
Chief Complaint   Patient presents with     RECHECK     post op follow up visit     Nadege Parker LPN

## 2019-07-17 NOTE — PROGRESS NOTES
"  Otolaryngology Clinic      Name: Sabine Mccormack  MRN: [id number]  Age: 74 year old  : 2019      Chief Complaint:   Postoperative follow up      History of Present Illness:   Sabine Mccormack is a 74 year old female who presents for postoperative follow up.  The patient is 11 days status post right sphenoidotomy.  She feels things went very well and she was able to return to regular activities by 4 or 5 days after surgery.  Essentially she feels back to normal.  Tissue was sent to pathology.  This was consistent with a fungus ball.      2019 Optical tracking system endoscopic sinus surgery (Right) Procedure: Right Sphenoidotomy with Stealth Image Guidance; Surgeon: Nayely Lorenzo MD; Location:  OR          Review of Systems:   Pertinent items are noted in HPI or as in patient entered ROS below, remainder of complete ROS is negative.       Physical Exam:   /76   Pulse 97   Resp 16   Ht 1.651 m (5' 5\")   Wt 60.7 kg (133 lb 14.4 oz)   BMI 22.28 kg/m       General Assessment   The patient is alert, oriented and in no acute distress.   Head/Face/Scalp  Grossly normal.   Nose  External nose is straight, skin is normal.     Procedure:  Endoscopy indicated for postoperative assessment  Topical anesthetic/decongestant spray applied.  Rigid scope used for visualization.  Findings:  Left side was clear.  Right side with dissolving gelfoam between the middle turbinate and septum and plug of crusting at the sphenoid osteum.  The sphenous mucosa was inflamed but healing appropriately.     Assessment and Plan:  Status post right sphenoidotomy, pathology consistent with fungal ball. Healing well without complication.  Sinus irrigation 2 -3 times per day for the next 7 - 10 days.        Follow-up: in about 2 - 3 months.         Scribe Disclosure:  I, Fiona Sharma, am serving as a scribe to document services personally performed by Nayely Lorenzo MD at this visit, based upon the provider's " statements to me. All documentation has been reviewed by the aforementioned provider prior to being entered into the official medical record.     The documentation recorded by the scribe accurately reflects the services I personally performed and the decisions made by me.   Nayely Lorenzo MD

## 2019-08-05 LAB
FUNGUS SPEC CULT: NORMAL
SPECIMEN SOURCE: NORMAL

## 2019-10-02 ENCOUNTER — HEALTH MAINTENANCE LETTER (OUTPATIENT)
Age: 74
End: 2019-10-02

## 2019-11-04 ENCOUNTER — OFFICE VISIT (OUTPATIENT)
Dept: OTOLARYNGOLOGY | Facility: CLINIC | Age: 74
End: 2019-11-04
Payer: MEDICARE

## 2019-11-04 VITALS
DIASTOLIC BLOOD PRESSURE: 77 MMHG | BODY MASS INDEX: 22.49 KG/M2 | TEMPERATURE: 98.7 F | WEIGHT: 135 LBS | OXYGEN SATURATION: 97 % | HEART RATE: 88 BPM | SYSTOLIC BLOOD PRESSURE: 125 MMHG | RESPIRATION RATE: 18 BRPM | HEIGHT: 65 IN

## 2019-11-04 DIAGNOSIS — J32.3 SINUSITIS CHRONIC, SPHENOIDAL: Primary | ICD-10-CM

## 2019-11-04 LAB
GRAM STN SPEC: ABNORMAL
Lab: ABNORMAL
SPECIMEN SOURCE: ABNORMAL

## 2019-11-04 PROCEDURE — 87181 SC STD AGAR DILUTION PER AGT: CPT | Performed by: OTOLARYNGOLOGY

## 2019-11-04 PROCEDURE — 87070 CULTURE OTHR SPECIMN AEROBIC: CPT | Performed by: OTOLARYNGOLOGY

## 2019-11-04 PROCEDURE — 87205 SMEAR GRAM STAIN: CPT | Performed by: OTOLARYNGOLOGY

## 2019-11-04 PROCEDURE — 87077 CULTURE AEROBIC IDENTIFY: CPT | Performed by: OTOLARYNGOLOGY

## 2019-11-04 RX ORDER — DOXYCYCLINE HYCLATE 100 MG
100 TABLET ORAL 2 TIMES DAILY
Qty: 42 TABLET | Refills: 0 | Status: SHIPPED | OUTPATIENT
Start: 2019-11-04 | End: 2019-11-25

## 2019-11-04 ASSESSMENT — PAIN SCALES - GENERAL: PAINLEVEL: NO PAIN (0)

## 2019-11-04 ASSESSMENT — MIFFLIN-ST. JEOR: SCORE: 1116.36

## 2019-11-04 NOTE — LETTER
"2019       RE: Sabine Mccormack  [address] [address]     Dear Colleague,    Thank you for referring your patient, Sabine Mccormack, to the [facility] EAR NOSE AND THROAT at [facility]. Please see a copy of my visit note below.      Otolaryngology Clinic      Name: Sabine Mccormack  MRN: [id number]  Age: 74 year old  : 2019      Chief Complaint:   Follow up, chronic sinusitis    History of Present Illness:   Sabine Mccormack is a 74 year old female with a history of chronic sinusitis who presents for follow up. I last saw the patient on 19 after right sphenoidotomy. The patient reports recently she has had lingering URI-type symptoms. She had 3 days of fever, congestion, rhinorrhea. The fever resolved but she has had difficulty sleeping and some sinus pressure. She states these symptoms are mild compared to how she felt before surgery and overall feels well.     2019 Optical tracking system endoscopic sinus surgery (Right) Procedure: Right Sphenoidotomy with Stealth Image Guidance; Surgeon: Nayely Lorenzo MD; Location:  OR     Review of Systems:   Pertinent items are noted in HPI or as in patient entered ROS below, remainder of complete ROS is negative.    ENT ROS 2019   Constitutional Problems with sleep   Ears, Nose, Throat Nasal congestion or drainage   Gastrointestinal/Genitourinary -   Musculoskeletal Sore or stiff joints         Physical Exam:   /77   Pulse 88   Temp 98.7  F (37.1  C)   Resp 18   Ht 1.64 m (5' 4.57\")   Wt 61.2 kg (135 lb)   SpO2 97%   BMI 22.77 kg/m        General Assessment   The patient is alert, oriented and in no acute distress.   Head/Face/Scalp  Grossly normal.   Nose  External nose is straight, skin is normal.  Septum mainly in the midline.      Procedure:  Endoscopy indicated to evaluate chronic sinusitis.   Topical anesthetic/decongestant spray applied.  Rigid scope used for " visualization.  Findings: Inflammation and purulent drainage debrided with suction. Sinus is open.  Obtained culture. Moderate bleeding related to the inflammation.      Assessment and Plan:  There are no diagnoses linked to this encounter.     Sabine Mccormack is a 74 year old female who presents for follow up. I performed right sphenoidotomy in July 2019 and the patient has felt improved following this. She notes rather mild symptoms of some sinus pressure and low quality sleep. Her exam is concerning for acute infection. I obtained a culture and reviewed prior culture, which did not have any growth. I will prescribe doxycycline and our team will contact her with culture results and any necessary changes to antibiotic.      Follow-up: In 4 weeks for recheck.         Scribe Disclosure:  I, Johana Ronen, am serving as a scribe to document services personally performed by Nayely Lorenzo MD at this visit, based upon the provider's statements to me. All documentation has been reviewed by the aforementioned provider prior to being entered into the official medical record.    The documentation recorded by the scribe accurately reflects the services I personally performed and the decisions made by me.  Nayely Lorenzo MD    Again, thank you for allowing me to participate in the care of your patient.      Sincerely,    Nayely Lorenzo MD

## 2019-11-04 NOTE — PROGRESS NOTES
"  Otolaryngology Clinic      Name: Sabine Mccormack  MRN: [id number]  Age: 74 year old  : 2019      Chief Complaint:   Follow up, chronic sinusitis    History of Present Illness:   Sabine Mccormack is a 74 year old female with a history of chronic sinusitis who presents for follow up. I last saw the patient on 19 after right sphenoidotomy. The patient reports recently she has had lingering URI-type symptoms. She had 3 days of fever, congestion, rhinorrhea. The fever resolved but she has had difficulty sleeping and some sinus pressure. She states these symptoms are mild compared to how she felt before surgery and overall feels well.     2019 Optical tracking system endoscopic sinus surgery (Right) Procedure: Right Sphenoidotomy with Stealth Image Guidance; Surgeon: Nayely Lorenzo MD; Location:  OR     Review of Systems:   Pertinent items are noted in HPI or as in patient entered ROS below, remainder of complete ROS is negative.   [facility] 2019   Constitutional Problems with sleep   Ears, Nose, Throat Nasal congestion or drainage   Gastrointestinal/Genitourinary -   Musculoskeletal Sore or stiff joints         Physical Exam:   /77   Pulse 88   Temp 98.7  F (37.1  C)   Resp 18   Ht 1.64 m (5' 4.57\")   Wt 61.2 kg (135 lb)   SpO2 97%   BMI 22.77 kg/m       General Assessment   The patient is alert, oriented and in no acute distress.   Head/Face/Scalp  Grossly normal.   Nose  External nose is straight, skin is normal.  Septum mainly in the midline.      Procedure:  Endoscopy indicated to evaluate chronic sinusitis.   Topical anesthetic/decongestant spray applied.  Rigid scope used for visualization.  Findings: Inflammation and purulent drainage debrided with suction. Sinus is open.  Obtained culture. Moderate bleeding related to the inflammation.      Assessment and Plan:  There are no diagnoses linked to this encounter.     Sabine Mccormack is a 74 year old female who " presents for follow up. I performed right sphenoidotomy in July 2019 and the patient has felt improved following this. She notes rather mild symptoms of some sinus pressure and low quality sleep. Her exam is concerning for acute infection. I obtained a culture and reviewed prior culture, which did not have any growth. I will prescribe doxycycline and our team will contact her with culture results and any necessary changes to antibiotic.      Follow-up: In 4 weeks for recheck.         Scribe Disclosure:  I, Johana Ronen, am serving as a scribe to document services personally performed by Nayely Lorenzo MD at this visit, based upon the provider's statements to me. All documentation has been reviewed by the aforementioned provider prior to being entered into the official medical record.    The documentation recorded by the scribe accurately reflects the services I personally performed and the decisions made by me.  Nayely Lorenzo MD

## 2019-11-04 NOTE — NURSING NOTE
"Chief Complaint   Patient presents with     RECHECK     follow up      Blood pressure 125/77, pulse 88, temperature 98.7  F (37.1  C), resp. rate 18, height 1.64 m (5' 4.57\"), weight 61.2 kg (135 lb), SpO2 97 %.    Bertrand Duarte LPN    "

## 2019-11-08 LAB
BACTERIA SPEC CULT: ABNORMAL
Lab: ABNORMAL
SPECIMEN SOURCE: ABNORMAL

## 2019-11-15 NOTE — RESULT ENCOUNTER NOTE
I sent a imo.im message. She has been on doxycycline, this bacteria should be sensitive. I will see her on Monday    Nayely Lorenzo MD

## 2019-11-18 ENCOUNTER — OFFICE VISIT (OUTPATIENT)
Dept: OTOLARYNGOLOGY | Facility: CLINIC | Age: 74
End: 2019-11-18
Payer: MEDICARE

## 2019-11-18 VITALS — BODY MASS INDEX: 22.33 KG/M2 | HEIGHT: 65 IN | WEIGHT: 134 LBS

## 2019-11-18 DIAGNOSIS — J32.3 SINUSITIS CHRONIC, SPHENOIDAL: Primary | ICD-10-CM

## 2019-11-18 ASSESSMENT — PAIN SCALES - GENERAL: PAINLEVEL: NO PAIN (0)

## 2019-11-18 ASSESSMENT — MIFFLIN-ST. JEOR: SCORE: 1118.7

## 2019-11-18 NOTE — PATIENT INSTRUCTIONS
Thank You for choosing [facility]. You were seen in the ENT Clinic today.     has recommended the followin. Follow up in clinic in 1 month      If you have any questions or concerns after your appointment, please  Call [phone].

## 2019-11-18 NOTE — LETTER
"2019       RE: Sabine Mccormack  [address] [address]     Dear Colleague,    Thank you for referring your patient, Sabine Mccormack, to the [facility] EAR NOSE AND THROAT at [facility]. Please see a copy of my visit note below.    Otolaryngology Clinic      Name: Sabine Mccormack  MRN: [id number]  Age: 74 year old  : 2019      Chief Complaint:   Follow up     History of Present Illness:   Sabine Mccormack is a 74 year old female who presents for follow up after recent acute exacerbation of her chronic sinusitis. She is s/p right sphenoidotomy which I performed in 2019. She was feeling improved after this. I last saw the patient on 19 and she had mild symptoms of sinus pressure and low quality sleep. Her exam was concerning for acute infection and I prescribed doxycycline. Her culture grew Eikenella corrodens, sensitive to doxycyline.    Today, patient reports her sinus pressure has resolved and she feels sinus symptoms have improved overall. She has had some loose stools and nausea while taking doxycycline. Of note, patient did have a root canal about 3-4 months ago.     2019 Optical tracking system endoscopic sinus surgery (Right) Procedure: Right Sphenoidotomy with Stealth Image Guidance; Surgeon: Nayely Lorenzo MD; Location:  OR          Review of Systems:   Pertinent items are noted in HPI or as in patient entered ROS below, remainder of complete ROS is negative.   [facility] ROS 2019   Constitutional Unexplained fever or night sweats   Ears, Nose, Throat Nasal congestion or drainage   Gastrointestinal/Genitourinary Unexplained nausea/vomiting   Musculoskeletal Sore or stiff joints, Swollen joints         Physical Exam:   Ht 1.651 m (5' 5\")   Wt 60.8 kg (134 lb)   BMI 22.30 kg/m        General Assessment   The patient is alert, oriented and in no acute distress.   Head/Face/Scalp  Grossly normal.   Nose  External nose " is straight, skin is normal.      Procedure:  Endoscopy indicated to evaluate for pathology.   Topical anesthetic/decongestant spray applied.  Rigid scope used for visualization.  Findings: much less inflammation, I was able to cannulate the sinus and suction thick secretions from the sphenoid. She tolerates this well.   Debrided with suction, decreased inflammation with patent sinus opening     Assessment and Plan:  Sabine Mccormack is a 74 year old female with history of chronic sinusitis. At her last visit 2 weeks ago, I prescribed doxycycline due acute sinus infection. Patient has had improvement of sinus symptoms with this, but has had some nausea and looser stools as a side effect. I encouraged her to finish the last week of the course as long as she is still tolerating the medication. This is important as she resumed methotrexate use last week. Her exam is improved and I was able to clean some secretions as above. She will return in 1 month.      Scribe Disclosure:  I, Johana Ronen, am serving as a scribe to document services personally performed by Nayely Lorenzo MD at this visit, based upon the provider's statements to me. All documentation has been reviewed by the aforementioned provider prior to being entered into the official medical record.      The documentation recorded by the scribe accurately reflects the services I personally performed and the decisions made by me.  Nayely Lorenzo MD    Again, thank you for allowing me to participate in the care of your patient.      Sincerely,    Nayely Lorenzo MD

## 2019-11-18 NOTE — NURSING NOTE
"Chief Complaint   Patient presents with     RECHECK     Follow-up     Height 1.651 m (5' 5\"), weight 60.8 kg (134 lb).    Farzana Callaway, EMT  "

## 2019-12-15 ENCOUNTER — HEALTH MAINTENANCE LETTER (OUTPATIENT)
Age: 74
End: 2019-12-15

## 2019-12-26 ENCOUNTER — PRE VISIT (OUTPATIENT)
Dept: OTOLARYNGOLOGY | Facility: CLINIC | Age: 74
End: 2019-12-26

## 2019-12-26 NOTE — TELEPHONE ENCOUNTER
Previsit Call Details  Reason for previsit call: eCheck-in Assistance  Number of days until appointment: 5  Call attempt number: 1  Call outcome: Successful  Notes: patient says she uses e check in all the time and will do it before appointment.

## 2019-12-30 ENCOUNTER — OFFICE VISIT (OUTPATIENT)
Dept: OTOLARYNGOLOGY | Facility: CLINIC | Age: 74
End: 2019-12-30
Payer: MEDICARE

## 2019-12-30 VITALS — HEIGHT: 65 IN | BODY MASS INDEX: 22.82 KG/M2 | WEIGHT: 137 LBS

## 2019-12-30 DIAGNOSIS — J32.3 SINUSITIS CHRONIC, SPHENOIDAL: Primary | ICD-10-CM

## 2019-12-30 ASSESSMENT — MIFFLIN-ST. JEOR: SCORE: 1132.31

## 2019-12-30 ASSESSMENT — PAIN SCALES - GENERAL: PAINLEVEL: NO PAIN (0)

## 2019-12-30 NOTE — NURSING NOTE
"Chief Complaint   Patient presents with     RECHECK     1 month follow-up     Height 1.651 m (5' 5\"), weight 62.1 kg (137 lb).    Farzana Callaway, EMT    "

## 2019-12-30 NOTE — PROGRESS NOTES
"  Otolaryngology Clinic      Name: Sabine Mccormack  MRN: [id number]  Age: 74 year old  : 2019      Chief Complaint:   Follow up-sinusitis      History of Present Illness:   Sabine Mccormack is a 74 year old female with a history of chronic sinusitis. She presents for follow up today. I last saw her about 1 month ago on 19 following an acute exacerbation of her chronic sinusitis. At that time, she had completed 2 out of 3 weeks of doxycycline and was having some nausea and loose stools as side effects, but feeling improved in regard to her sinus symptoms. Today, she is feeling well she has some nasal congestion, but no worsening of this, no new drainage or facial pain.     2019 Optical tracking system endoscopic sinus surgery (Right) Procedure: Right Sphenoidotomy with Stealth Image Guidance; Surgeon: Nayely Lorenzo MD; [facility]       Review of Systems:   Pertinent items are noted in HPI or as in patient entered ROS below, remainder of complete ROS is negative.   [facility] ROS 2019   Constitutional -   Ears, Nose, Throat Nasal congestion or drainage, Sore throat, Trouble swallowing   Gastrointestinal/Genitourinary -   Musculoskeletal Sore or stiff joints, Back pain         Physical Exam:   Ht 1.651 m (5' 5\")   Wt 62.1 kg (137 lb)   BMI 22.80 kg/m       General Assessment   The patient is alert, oriented and in no acute distress.   Head/Face/Scalp  Grossly normal.   Nose  External nose is straight, skin is normal.     Procedure:  Endoscopy indicated to evaluate for pathology.   Topical anesthetic/decongestant spray applied.  Rigid scope used for visualization.  Findings: Patent , healthy sphenoid sinus opening. No purulent drainage. No inflammation. Overall healthy appearance.       Assessment and Plan:  Sabine Mccormack is here for follow up after an acute exacerbation of her chronic sinusitis. She is doing well and her exam is very healthy appearing. She will follow up " with new symptoms such as nasal drained, cough, facial pain, congestion, or any other concerns.       Scribe Disclosure:  I, Johana Hardwick, am serving as a scribe to document services personally performed by Nayely Lorenzo MD at this visit, based upon the provider's statements to me. All documentation has been reviewed by the aforementioned provider prior to being entered into the official medical record.      The documentation recorded by the scribe accurately reflects the services I personally performed and the decisions made by me.  Nayely Lorenzo MD

## 2019-12-30 NOTE — PATIENT INSTRUCTIONS
Thank You for choosing [facility]. You were seen in the ENT Clinic today.     has recommended the followin. Follow up as needed.      If you have any questions or concerns after your appointment, please  Call [phone].

## 2019-12-30 NOTE — LETTER
"2019     RE: Sabine Mccormack  [address] [facility] [address]     Dear Colleague,    Thank you for referring your patient, Sabine Mccormack, to the [facility] EAR NOSE AND THROAT at [facility]. Please see a copy of my visit note below.  Otolaryngology Clinic      Name: Sabine Mccormack  MRN: [id number]  Age: 74 year old  : 2019      Chief Complaint:   Follow up-sinusitis      History of Present Illness:   Sabine Mccormack is a 74 year old female with a history of chronic sinusitis. She presents for follow up today. I last saw her about 1 month ago on 19 following an acute exacerbation of her chronic sinusitis. At that time, she had completed 2 out of 3 weeks of doxycycline and was having some nausea and loose stools as side effects, but feeling improved in regard to her sinus symptoms. Today, she is feeling well she has some nasal congestion, but no worsening of this, no new drainage or facial pain.     2019 Optical tracking system endoscopic sinus surgery (Right) Procedure: Right Sphenoidotomy with Stealth Image Guidance; Surgeon: Nayely Lorenzo MD; [facility]       Review of Systems:   Pertinent items are noted in HPI or as in patient entered ROS below, remainder of complete ROS is negative.   [facility] ROS 2019   Constitutional -   Ears, Nose, Throat Nasal congestion or drainage, Sore throat, Trouble swallowing   Gastrointestinal/Genitourinary -   Musculoskeletal Sore or stiff joints, Back pain         Physical Exam:   Ht 1.651 m (5' 5\")   Wt 62.1 kg (137 lb)   BMI 22.80 kg/m        General Assessment   The patient is alert, oriented and in no acute distress.   Head/Face/Scalp  Grossly normal.   Nose  External nose is straight, skin is normal.     Procedure:  Endoscopy indicated to evaluate for pathology.   Topical anesthetic/decongestant spray applied.  Rigid scope used for visualization.  Findings: Patent , healthy sphenoid " sinus opening. No purulent drainage. No inflammation. Overall healthy appearance.       Assessment and Plan:  Sabnie Mccormack is here for follow up after an acute exacerbation of her chronic sinusitis. She is doing well and her exam is very healthy appearing. She will follow up with new symptoms such as nasal drained, cough, facial pain, congestion, or any other concerns.       Scribe Disclosure:  I, Johana Ronen, am serving as a scribe to document services personally performed by Nayely Lorenzo MD at this visit, based upon the provider's statements to me. All documentation has been reviewed by the aforementioned provider prior to being entered into the official medical record.      The documentation recorded by the scribe accurately reflects the services I personally performed and the decisions made by me.  Nayely Lorenzo MD

## 2021-01-15 ENCOUNTER — HEALTH MAINTENANCE LETTER (OUTPATIENT)
Age: 76
End: 2021-01-15

## 2021-05-25 ENCOUNTER — RECORDS - HEALTHEAST (OUTPATIENT)
Dept: ADMINISTRATIVE | Facility: CLINIC | Age: 76
End: 2021-05-25

## 2021-05-27 ENCOUNTER — RECORDS - HEALTHEAST (OUTPATIENT)
Dept: ADMINISTRATIVE | Facility: CLINIC | Age: 76
End: 2021-05-27

## 2021-09-04 ENCOUNTER — HEALTH MAINTENANCE LETTER (OUTPATIENT)
Age: 76
End: 2021-09-04

## 2022-02-19 ENCOUNTER — HEALTH MAINTENANCE LETTER (OUTPATIENT)
Age: 77
End: 2022-02-19

## 2022-10-22 ENCOUNTER — HEALTH MAINTENANCE LETTER (OUTPATIENT)
Age: 77
End: 2022-10-22

## 2023-04-01 ENCOUNTER — HEALTH MAINTENANCE LETTER (OUTPATIENT)
Age: 78
End: 2023-04-01

## (undated) DEVICE — SPONGE COTTONOID 1/2X3" 80-1407

## (undated) DEVICE — LINEN TOWEL PACK X5 5464

## (undated) DEVICE — TRACKER ENT OTS INSTRUMENT FUSION 9733533

## (undated) DEVICE — SUCTION MANIFOLD NEPTUNE 2 SYS 1 PORT 702-025-000

## (undated) DEVICE — SOL NACL 0.9% INJ 1000ML BAG 2B1324X

## (undated) DEVICE — NDL 25GA 2"  8881200441

## (undated) DEVICE — ESU GROUND PAD ADULT W/CORD E7507

## (undated) DEVICE — DRAPE WARMER 66X44" ORS-300

## (undated) DEVICE — DRSG HOLDER NASAL DALE 600

## (undated) DEVICE — TUBING SUCTION 12"X1/4" N612

## (undated) DEVICE — GLOVE PROTEXIS POWDER FREE SMT 6.5  2D72PT65X

## (undated) DEVICE — SYR 30ML LL W/O NDL 302832

## (undated) DEVICE — TUBING IRRIGATOR STRAIGHTSHOT XPS 1895522

## (undated) DEVICE — PACK ENT ENDOSCOPY CUSTOM ASC

## (undated) DEVICE — SOL NACL 0.9% IRRIG 1000ML BOTTLE 2F7124

## (undated) DEVICE — SPECIMEN TRAP STRYKER NEPTUNE IN-LINE 0700-050-000

## (undated) DEVICE — BLADE SINUS TRICUT STR 4MMX13CM FUSION ROTATE W/TRACKING

## (undated) DEVICE — ENDO SHEATH STORZ SHARPSITE ENDOSCRUB 0DEG 4MM 1912000

## (undated) DEVICE — SYR 03ML LL W/O NDL 309657

## (undated) DEVICE — WIPE INSTRUMENT MEROCEL 400200

## (undated) DEVICE — SOL WATER IRRIG 1000ML BOTTLE 2F7114

## (undated) DEVICE — TRACKER ENT OTS PATIENT FUSION 9733534

## (undated) RX ORDER — LIDOCAINE HYDROCHLORIDE 20 MG/ML
INJECTION, SOLUTION EPIDURAL; INFILTRATION; INTRACAUDAL; PERINEURAL
Status: DISPENSED
Start: 2019-07-08

## (undated) RX ORDER — ACETAMINOPHEN 325 MG/1
TABLET ORAL
Status: DISPENSED
Start: 2019-07-08

## (undated) RX ORDER — FENTANYL CITRATE 50 UG/ML
INJECTION, SOLUTION INTRAMUSCULAR; INTRAVENOUS
Status: DISPENSED
Start: 2019-07-08

## (undated) RX ORDER — OXYCODONE HYDROCHLORIDE 5 MG/1
TABLET ORAL
Status: DISPENSED
Start: 2019-07-08

## (undated) RX ORDER — DEXAMETHASONE SODIUM PHOSPHATE 4 MG/ML
INJECTION, SOLUTION INTRA-ARTICULAR; INTRALESIONAL; INTRAMUSCULAR; INTRAVENOUS; SOFT TISSUE
Status: DISPENSED
Start: 2019-07-08

## (undated) RX ORDER — PHENYLEPHRINE HCL IN 0.9% NACL 1 MG/10 ML
SYRINGE (ML) INTRAVENOUS
Status: DISPENSED
Start: 2019-07-08

## (undated) RX ORDER — ONDANSETRON 2 MG/ML
INJECTION INTRAMUSCULAR; INTRAVENOUS
Status: DISPENSED
Start: 2019-07-08

## (undated) RX ORDER — PROPOFOL 10 MG/ML
INJECTION, EMULSION INTRAVENOUS
Status: DISPENSED
Start: 2019-07-08